# Patient Record
Sex: FEMALE | Race: WHITE | URBAN - METROPOLITAN AREA
[De-identification: names, ages, dates, MRNs, and addresses within clinical notes are randomized per-mention and may not be internally consistent; named-entity substitution may affect disease eponyms.]

---

## 2024-05-15 ENCOUNTER — APPOINTMENT (RX ONLY)
Dept: URBAN - METROPOLITAN AREA CLINIC 144 | Facility: CLINIC | Age: 59
Setting detail: DERMATOLOGY
End: 2024-05-15

## 2024-05-15 DIAGNOSIS — J9571 ACCIDENTAL PUNCTURE OR LACERATION DURING A PROCEDURE, NOT ELSEWHERE CLASSIFIED: ICD-10-CM

## 2024-05-15 DIAGNOSIS — H9531 ACCIDENTAL PUNCTURE OR LACERATION DURING A PROCEDURE, NOT ELSEWHERE CLASSIFIED: ICD-10-CM

## 2024-05-15 DIAGNOSIS — M96820 ACCIDENTAL PUNCTURE OR LACERATION DURING A PROCEDURE, NOT ELSEWHERE CLASSIFIED: ICD-10-CM

## 2024-05-15 DIAGNOSIS — D7812 ACCIDENTAL PUNCTURE OR LACERATION DURING A PROCEDURE, NOT ELSEWHERE CLASSIFIED: ICD-10-CM

## 2024-05-15 DIAGNOSIS — N9972 ACCIDENTAL PUNCTURE OR LACERATION DURING A PROCEDURE, NOT ELSEWHERE CLASSIFIED: ICD-10-CM

## 2024-05-15 DIAGNOSIS — K9171 ACCIDENTAL PUNCTURE OR LACERATION DURING A PROCEDURE, NOT ELSEWHERE CLASSIFIED: ICD-10-CM

## 2024-05-15 DIAGNOSIS — I9752 ACCIDENTAL PUNCTURE OR LACERATION DURING A PROCEDURE, NOT ELSEWHERE CLASSIFIED: ICD-10-CM

## 2024-05-15 DIAGNOSIS — L7611 ACCIDENTAL PUNCTURE OR LACERATION DURING A PROCEDURE, NOT ELSEWHERE CLASSIFIED: ICD-10-CM

## 2024-05-15 DIAGNOSIS — E3612 ACCIDENTAL PUNCTURE OR LACERATION DURING A PROCEDURE, NOT ELSEWHERE CLASSIFIED: ICD-10-CM

## 2024-05-15 DIAGNOSIS — H59219 ACCIDENTAL PUNCTURE OR LACERATION DURING A PROCEDURE, NOT ELSEWHERE CLASSIFIED: ICD-10-CM

## 2024-05-15 DIAGNOSIS — M96821 ACCIDENTAL PUNCTURE OR LACERATION DURING A PROCEDURE, NOT ELSEWHERE CLASSIFIED: ICD-10-CM

## 2024-05-15 DIAGNOSIS — D7811 ACCIDENTAL PUNCTURE OR LACERATION DURING A PROCEDURE, NOT ELSEWHERE CLASSIFIED: ICD-10-CM

## 2024-05-15 DIAGNOSIS — I9751 ACCIDENTAL PUNCTURE OR LACERATION DURING A PROCEDURE, NOT ELSEWHERE CLASSIFIED: ICD-10-CM

## 2024-05-15 DIAGNOSIS — T888XXA ACCIDENTAL PUNCTURE OR LACERATION DURING A PROCEDURE, NOT ELSEWHERE CLASSIFIED: ICD-10-CM

## 2024-05-15 DIAGNOSIS — L7612 ACCIDENTAL PUNCTURE OR LACERATION DURING A PROCEDURE, NOT ELSEWHERE CLASSIFIED: ICD-10-CM

## 2024-05-15 DIAGNOSIS — G9748 ACCIDENTAL PUNCTURE OR LACERATION DURING A PROCEDURE, NOT ELSEWHERE CLASSIFIED: ICD-10-CM

## 2024-05-15 DIAGNOSIS — G9749 ACCIDENTAL PUNCTURE OR LACERATION DURING A PROCEDURE, NOT ELSEWHERE CLASSIFIED: ICD-10-CM

## 2024-05-15 DIAGNOSIS — N9971 ACCIDENTAL PUNCTURE OR LACERATION DURING A PROCEDURE, NOT ELSEWHERE CLASSIFIED: ICD-10-CM

## 2024-05-15 DIAGNOSIS — H9532 ACCIDENTAL PUNCTURE OR LACERATION DURING A PROCEDURE, NOT ELSEWHERE CLASSIFIED: ICD-10-CM

## 2024-05-15 DIAGNOSIS — H59229 ACCIDENTAL PUNCTURE OR LACERATION DURING A PROCEDURE, NOT ELSEWHERE CLASSIFIED: ICD-10-CM

## 2024-05-15 DIAGNOSIS — J9572 ACCIDENTAL PUNCTURE OR LACERATION DURING A PROCEDURE, NOT ELSEWHERE CLASSIFIED: ICD-10-CM

## 2024-05-15 DIAGNOSIS — E3611 ACCIDENTAL PUNCTURE OR LACERATION DURING A PROCEDURE, NOT ELSEWHERE CLASSIFIED: ICD-10-CM

## 2024-05-15 DIAGNOSIS — K9172 ACCIDENTAL PUNCTURE OR LACERATION DURING A PROCEDURE, NOT ELSEWHERE CLASSIFIED: ICD-10-CM

## 2024-05-15 PROBLEM — S01.511A LACERATION WITHOUT FOREIGN BODY OF LIP, INITIAL ENCOUNTER: Status: ACTIVE | Noted: 2024-05-15

## 2024-05-15 PROCEDURE — 99203 OFFICE O/P NEW LOW 30 MIN: CPT

## 2024-05-15 PROCEDURE — ? ADDITIONAL NOTES

## 2024-05-15 PROCEDURE — ? COUNSELING - LACERATION

## 2024-05-15 ASSESSMENT — LOCATION ZONE DERM: LOCATION ZONE: LIP

## 2024-05-15 ASSESSMENT — LOCATION SIMPLE DESCRIPTION DERM: LOCATION SIMPLE: LEFT LIP

## 2024-05-15 ASSESSMENT — PAIN INTENSITY VAS: HOW INTENSE IS YOUR PAIN 0 BEING NO PAIN, 10 BEING THE MOST SEVERE PAIN POSSIBLE?: 3/10 PAIN

## 2024-05-15 ASSESSMENT — LOCATION DETAILED DESCRIPTION DERM: LOCATION DETAILED: LEFT INFERIOR VERMILION LIP

## 2024-05-15 NOTE — PROCEDURE: ADDITIONAL NOTES
Additional Notes: Advised patient to continue cleansing area daily and apply Vaseline. Patient also instructed to apply warm compresses to affected area.
Render Risk Assessment In Note?: no

## 2024-05-24 ENCOUNTER — APPOINTMENT (RX ONLY)
Dept: URBAN - METROPOLITAN AREA CLINIC 144 | Facility: CLINIC | Age: 59
Setting detail: DERMATOLOGY
End: 2024-05-24

## 2024-05-24 DIAGNOSIS — S31000A OPEN WOUND(S) (MULTIPLE) OF UNSPECIFIED SITE(S), WITHOUT MENTION OF COMPLICATION: ICD-10-CM

## 2024-05-24 PROBLEM — T07.XXXA UNSPECIFIED MULTIPLE INJURIES, INITIAL ENCOUNTER: Status: ACTIVE | Noted: 2024-05-24

## 2024-05-24 PROCEDURE — ? COUNSELING

## 2024-05-24 PROCEDURE — 99213 OFFICE O/P EST LOW 20 MIN: CPT

## 2024-05-24 PROCEDURE — ? ADDITIONAL NOTES

## 2024-05-24 NOTE — PROCEDURE: ADDITIONAL NOTES
Render Risk Assessment In Note?: no
Detail Level: Simple
Additional Notes: Site healing within normal limits. Patient removed sutures herself. Advised patient to return to office if needed.

## 2025-02-12 ENCOUNTER — APPOINTMENT (OUTPATIENT)
Dept: URBAN - METROPOLITAN AREA CLINIC 144 | Facility: CLINIC | Age: 60
Setting detail: DERMATOLOGY
End: 2025-02-12

## 2025-02-12 DIAGNOSIS — Z41.9 ENCOUNTER FOR PROCEDURE FOR PURPOSES OTHER THAN REMEDYING HEALTH STATE, UNSPECIFIED: ICD-10-CM

## 2025-02-12 PROCEDURE — ? CONSULTATION - BOTULINUM TOXIN

## 2025-02-12 PROCEDURE — ? BOTOX

## 2025-02-12 PROCEDURE — ? CONSULTATION - FACELIFT

## 2025-02-12 PROCEDURE — ? CONSULTATION - FILLERS

## 2025-02-12 NOTE — PROCEDURE: CONSULTATION - BOTULINUM TOXIN
Platysma Primary Toxin Units (Estimated): 0
Periorbital (Crows) Primary Toxin Units (Estimated): 20
Send Procedure Quote As Charge: No
Detail Level: Detailed

## 2025-02-12 NOTE — PROCEDURE: CONSULTATION - FILLERS
Lower Lips Primary Filler Volume In Cc (Estimated): 0
Send Procedure Quote As Charge: No
Detail Level: Detailed
130

## 2025-02-12 NOTE — PROCEDURE: BOTOX
Reconstitution Date: 02/12/2025
Price Per Unit In $ (Use Numbers Only, No Text Please.): 0
Show Price In Note?: no
Postcare Instructions: Patient instructed to not lie down for 4 hours and limit physical activity for 24 hours. Patient instructed not to travel by airplane for 48 hours.
Periorbital Skin Units: 20
Map Statment: See attached map for complete details
Dilution (U/0.1 Cc): 4
Bill Summary Price Listed Below, Or Bill Total Of Units X Price Per Unit?: Bill Summary Price Below
Detail Level: Detailed
Show Inventory Tab: Show
Glabellar Complex Units: 15
Consent: Written consent was obtained prior to the procedure. Risks, benefits, expectations and alternatives were discussed including, but not limited to, infection, bleeding, lid/brow ptosis, bruising, swelling, diplopia, temporary effects, incomplete chemical denervation and dissatisfaction with the cosmetic outcome. No guarantee or warranty was given or implied regarding longevity of results.
Forehead Units: 10

## 2025-02-25 ENCOUNTER — APPOINTMENT (OUTPATIENT)
Dept: URBAN - METROPOLITAN AREA CLINIC 144 | Facility: CLINIC | Age: 60
Setting detail: DERMATOLOGY
End: 2025-02-25

## 2025-02-25 DIAGNOSIS — Z41.9 ENCOUNTER FOR PROCEDURE FOR PURPOSES OTHER THAN REMEDYING HEALTH STATE, UNSPECIFIED: ICD-10-CM

## 2025-02-25 PROCEDURE — ? COSMETIC FOLLOW-UP

## 2025-02-25 PROCEDURE — ? COSMETIC CONSULTATION: LASER RESURFACING

## 2025-02-25 PROCEDURE — ? COSMETIC CONSULTATION: BLEPHAROPLASTY

## 2025-02-25 PROCEDURE — ? ADDITIONAL NOTES

## 2025-02-25 NOTE — PROCEDURE: COSMETIC FOLLOW-UP
Comments (Free Text): Forehead Botox should be 15 units next visit
Treatment (Optional): Botox
Detail Level: Zone
Improvement Override (Free Text): minimal eyebrow drop
Global Improvement: Good

## 2025-02-25 NOTE — PROCEDURE: COSMETIC CONSULTATION: LASER RESURFACING
Send Procedure Quote As Charge: No
Detail Level: Detailed
Patient Specific Counseling (Will Not Stick From Patient To Patient): Laser first then possible facelift and/or blepharoplasty.

## 2025-02-25 NOTE — PROCEDURE: ADDITIONAL NOTES
Additional Notes: Advised patient to think about what she wants first either laser or surgery. Once she decides then she will call to schedule 2nd consult to discuss the surgery selected or just the laser.
Detail Level: Simple
Render Risk Assessment In Note?: no

## 2025-04-22 ENCOUNTER — APPOINTMENT (OUTPATIENT)
Dept: URBAN - METROPOLITAN AREA CLINIC 144 | Facility: CLINIC | Age: 60
Setting detail: DERMATOLOGY
End: 2025-04-22

## 2025-04-22 DIAGNOSIS — Z41.9 ENCOUNTER FOR PROCEDURE FOR PURPOSES OTHER THAN REMEDYING HEALTH STATE, UNSPECIFIED: ICD-10-CM

## 2025-04-22 PROCEDURE — ? BOTOX

## 2025-04-22 NOTE — PROCEDURE: BOTOX
Additional Area 2 Units: 0
Show Glabellar Units: Yes
Show Right And Left Pupillary Line Units: No
Glabellar Complex Units: 15
Detail Level: Detailed
Post-Care Instructions: Patient instructed to not lie down for 4 hours and limit physical activity for 24 hours.
Dilution (U/0.1 Cc): 4
Incrementing Botox Units: By 0.5 Units
Show Inventory Tab: Show
Patient Specific Comments (Will Not Stick From Patient To Patient): $12.00 per unit special price
Periorbital Skin Units: 20
Consent: Written consent obtained. Risks include but not limited to lid/brow ptosis, bruising, swelling, diplopia, temporary effect, incomplete chemical denervation.